# Patient Record
Sex: FEMALE | Race: WHITE | Employment: OTHER | URBAN - METROPOLITAN AREA
[De-identification: names, ages, dates, MRNs, and addresses within clinical notes are randomized per-mention and may not be internally consistent; named-entity substitution may affect disease eponyms.]

---

## 2019-06-13 RX ORDER — FLUTICASONE PROPIONATE 50 MCG
1 SPRAY, SUSPENSION (ML) NASAL DAILY PRN
COMMUNITY

## 2019-06-13 RX ORDER — CITALOPRAM HYDROBROMIDE 20 MG/1
20 TABLET ORAL DAILY
COMMUNITY

## 2019-06-17 ENCOUNTER — ANESTHESIA EVENT (OUTPATIENT)
Dept: SURGERY | Facility: CLINIC | Age: 39
End: 2019-06-17
Payer: COMMERCIAL

## 2019-06-17 ENCOUNTER — HOSPITAL ENCOUNTER (OUTPATIENT)
Facility: CLINIC | Age: 39
Discharge: HOME | End: 2019-06-17
Attending: OTOLARYNGOLOGY | Admitting: OTOLARYNGOLOGY
Payer: COMMERCIAL

## 2019-06-17 ENCOUNTER — ANESTHESIA (OUTPATIENT)
Dept: SURGERY | Facility: CLINIC | Age: 39
End: 2019-06-17
Payer: COMMERCIAL

## 2019-06-17 VITALS
HEIGHT: 62 IN | BODY MASS INDEX: 27.55 KG/M2 | DIASTOLIC BLOOD PRESSURE: 63 MMHG | TEMPERATURE: 98.1 F | WEIGHT: 149.69 LBS | HEART RATE: 77 BPM | OXYGEN SATURATION: 97 % | SYSTOLIC BLOOD PRESSURE: 104 MMHG | RESPIRATION RATE: 12 BRPM

## 2019-06-17 DIAGNOSIS — H70.11 CHRONIC MASTOIDITIS, RIGHT: Primary | ICD-10-CM

## 2019-06-17 LAB
GLUCOSE BLDC GLUCOMTR-MCNC: 97 MG/DL (ref 70–99)
HCG UR QL: NEGATIVE

## 2019-06-17 PROCEDURE — 25000128 H RX IP 250 OP 636: Performed by: ANESTHESIOLOGY

## 2019-06-17 PROCEDURE — 27210794 ZZH OR GENERAL SUPPLY STERILE: Performed by: OTOLARYNGOLOGY

## 2019-06-17 PROCEDURE — 25000566 ZZH SEVOFLURANE, EA 15 MIN: Performed by: OTOLARYNGOLOGY

## 2019-06-17 PROCEDURE — 36000062 ZZH SURGERY LEVEL 4 1ST 30 MIN - UMMC: Performed by: OTOLARYNGOLOGY

## 2019-06-17 PROCEDURE — 25000128 H RX IP 250 OP 636: Performed by: NURSE ANESTHETIST, CERTIFIED REGISTERED

## 2019-06-17 PROCEDURE — 25000125 ZZHC RX 250: Performed by: NURSE ANESTHETIST, CERTIFIED REGISTERED

## 2019-06-17 PROCEDURE — 25000132 ZZH RX MED GY IP 250 OP 250 PS 637: Performed by: OTOLARYNGOLOGY

## 2019-06-17 PROCEDURE — 25800030 ZZH RX IP 258 OP 636: Performed by: NURSE ANESTHETIST, CERTIFIED REGISTERED

## 2019-06-17 PROCEDURE — 37000008 ZZH ANESTHESIA TECHNICAL FEE, 1ST 30 MIN: Performed by: OTOLARYNGOLOGY

## 2019-06-17 PROCEDURE — 71000027 ZZH RECOVERY PHASE 2 EACH 15 MINS: Performed by: OTOLARYNGOLOGY

## 2019-06-17 PROCEDURE — 82962 GLUCOSE BLOOD TEST: CPT

## 2019-06-17 PROCEDURE — 25000125 ZZHC RX 250: Performed by: OTOLARYNGOLOGY

## 2019-06-17 PROCEDURE — 25000132 ZZH RX MED GY IP 250 OP 250 PS 637: Performed by: ANESTHESIOLOGY

## 2019-06-17 PROCEDURE — 37000009 ZZH ANESTHESIA TECHNICAL FEE, EACH ADDTL 15 MIN: Performed by: OTOLARYNGOLOGY

## 2019-06-17 PROCEDURE — 40000170 ZZH STATISTIC PRE-PROCEDURE ASSESSMENT II: Performed by: OTOLARYNGOLOGY

## 2019-06-17 PROCEDURE — 71000014 ZZH RECOVERY PHASE 1 LEVEL 2 FIRST HR: Performed by: OTOLARYNGOLOGY

## 2019-06-17 PROCEDURE — 36000064 ZZH SURGERY LEVEL 4 EA 15 ADDTL MIN - UMMC: Performed by: OTOLARYNGOLOGY

## 2019-06-17 PROCEDURE — 25000125 ZZHC RX 250: Performed by: ANESTHESIOLOGY

## 2019-06-17 PROCEDURE — 81025 URINE PREGNANCY TEST: CPT | Performed by: ANESTHESIOLOGY

## 2019-06-17 RX ORDER — CLINDAMYCIN HCL 300 MG
300 CAPSULE ORAL 3 TIMES DAILY
Qty: 30 CAPSULE | Refills: 0 | Status: SHIPPED | OUTPATIENT
Start: 2019-06-17

## 2019-06-17 RX ORDER — SODIUM CHLORIDE, SODIUM LACTATE, POTASSIUM CHLORIDE, CALCIUM CHLORIDE 600; 310; 30; 20 MG/100ML; MG/100ML; MG/100ML; MG/100ML
INJECTION, SOLUTION INTRAVENOUS CONTINUOUS PRN
Status: DISCONTINUED | OUTPATIENT
Start: 2019-06-17 | End: 2019-06-17

## 2019-06-17 RX ORDER — MUPIROCIN 20 MG/G
OINTMENT TOPICAL 3 TIMES DAILY
Qty: 1 G | Refills: 0 | Status: SHIPPED | OUTPATIENT
Start: 2019-06-17

## 2019-06-17 RX ORDER — CLINDAMYCIN PHOSPHATE 900 MG/50ML
900 INJECTION, SOLUTION INTRAVENOUS SEE ADMIN INSTRUCTIONS
Status: DISCONTINUED | OUTPATIENT
Start: 2019-06-17 | End: 2019-06-17 | Stop reason: HOSPADM

## 2019-06-17 RX ORDER — MAGNESIUM HYDROXIDE 1200 MG/15ML
LIQUID ORAL PRN
Status: DISCONTINUED | OUTPATIENT
Start: 2019-06-17 | End: 2019-06-17 | Stop reason: HOSPADM

## 2019-06-17 RX ORDER — EPHEDRINE SULFATE 50 MG/ML
INJECTION, SOLUTION INTRAMUSCULAR; INTRAVENOUS; SUBCUTANEOUS PRN
Status: DISCONTINUED | OUTPATIENT
Start: 2019-06-17 | End: 2019-06-17

## 2019-06-17 RX ORDER — FENTANYL CITRATE 50 UG/ML
INJECTION, SOLUTION INTRAMUSCULAR; INTRAVENOUS PRN
Status: DISCONTINUED | OUTPATIENT
Start: 2019-06-17 | End: 2019-06-17

## 2019-06-17 RX ORDER — ONDANSETRON 2 MG/ML
INJECTION INTRAMUSCULAR; INTRAVENOUS PRN
Status: DISCONTINUED | OUTPATIENT
Start: 2019-06-17 | End: 2019-06-17

## 2019-06-17 RX ORDER — PROPOFOL 10 MG/ML
INJECTION, EMULSION INTRAVENOUS PRN
Status: DISCONTINUED | OUTPATIENT
Start: 2019-06-17 | End: 2019-06-17

## 2019-06-17 RX ORDER — LIDOCAINE HYDROCHLORIDE AND EPINEPHRINE 10; 10 MG/ML; UG/ML
INJECTION, SOLUTION INFILTRATION; PERINEURAL PRN
Status: DISCONTINUED | OUTPATIENT
Start: 2019-06-17 | End: 2019-06-17 | Stop reason: HOSPADM

## 2019-06-17 RX ORDER — ONDANSETRON 2 MG/ML
4 INJECTION INTRAMUSCULAR; INTRAVENOUS EVERY 30 MIN PRN
Status: DISCONTINUED | OUTPATIENT
Start: 2019-06-17 | End: 2019-06-17 | Stop reason: HOSPADM

## 2019-06-17 RX ORDER — OXYMETAZOLINE HYDROCHLORIDE 0.05 G/100ML
SPRAY NASAL PRN
Status: DISCONTINUED | OUTPATIENT
Start: 2019-06-17 | End: 2019-06-17 | Stop reason: HOSPADM

## 2019-06-17 RX ORDER — SODIUM CHLORIDE, SODIUM LACTATE, POTASSIUM CHLORIDE, CALCIUM CHLORIDE 600; 310; 30; 20 MG/100ML; MG/100ML; MG/100ML; MG/100ML
INJECTION, SOLUTION INTRAVENOUS CONTINUOUS
Status: DISCONTINUED | OUTPATIENT
Start: 2019-06-17 | End: 2019-06-17 | Stop reason: HOSPADM

## 2019-06-17 RX ORDER — DEXAMETHASONE SODIUM PHOSPHATE 4 MG/ML
INJECTION, SOLUTION INTRA-ARTICULAR; INTRALESIONAL; INTRAMUSCULAR; INTRAVENOUS; SOFT TISSUE PRN
Status: DISCONTINUED | OUTPATIENT
Start: 2019-06-17 | End: 2019-06-17

## 2019-06-17 RX ORDER — LIDOCAINE 40 MG/G
CREAM TOPICAL
Status: DISCONTINUED | OUTPATIENT
Start: 2019-06-17 | End: 2019-06-17 | Stop reason: HOSPADM

## 2019-06-17 RX ORDER — GABAPENTIN 100 MG/1
300 CAPSULE ORAL ONCE
Status: COMPLETED | OUTPATIENT
Start: 2019-06-17 | End: 2019-06-17

## 2019-06-17 RX ORDER — CIPROFLOXACIN AND DEXAMETHASONE 3; 1 MG/ML; MG/ML
4 SUSPENSION/ DROPS AURICULAR (OTIC) 2 TIMES DAILY
Qty: 1 BOTTLE | Refills: 0 | Status: SHIPPED | OUTPATIENT
Start: 2019-06-17

## 2019-06-17 RX ORDER — ACETAMINOPHEN 325 MG/1
975 TABLET ORAL ONCE
Status: COMPLETED | OUTPATIENT
Start: 2019-06-17 | End: 2019-06-17

## 2019-06-17 RX ORDER — LIDOCAINE HYDROCHLORIDE 20 MG/ML
INJECTION, SOLUTION INFILTRATION; PERINEURAL PRN
Status: DISCONTINUED | OUTPATIENT
Start: 2019-06-17 | End: 2019-06-17

## 2019-06-17 RX ORDER — ONDANSETRON 4 MG/1
4 TABLET, FILM COATED ORAL EVERY 6 HOURS PRN
Qty: 15 TABLET | Refills: 0 | Status: SHIPPED | OUTPATIENT
Start: 2019-06-17

## 2019-06-17 RX ORDER — SCOLOPAMINE TRANSDERMAL SYSTEM 1 MG/1
1 PATCH, EXTENDED RELEASE TRANSDERMAL ONCE
Status: COMPLETED | OUTPATIENT
Start: 2019-06-17 | End: 2019-06-17

## 2019-06-17 RX ORDER — ONDANSETRON 4 MG/1
4 TABLET, ORALLY DISINTEGRATING ORAL EVERY 30 MIN PRN
Status: DISCONTINUED | OUTPATIENT
Start: 2019-06-17 | End: 2019-06-17 | Stop reason: HOSPADM

## 2019-06-17 RX ORDER — CLINDAMYCIN PHOSPHATE 900 MG/50ML
900 INJECTION, SOLUTION INTRAVENOUS
Status: COMPLETED | OUTPATIENT
Start: 2019-06-17 | End: 2019-06-17

## 2019-06-17 RX ORDER — FENTANYL CITRATE 50 UG/ML
25-50 INJECTION, SOLUTION INTRAMUSCULAR; INTRAVENOUS
Status: DISCONTINUED | OUTPATIENT
Start: 2019-06-17 | End: 2019-06-17 | Stop reason: HOSPADM

## 2019-06-17 RX ORDER — MEPERIDINE HYDROCHLORIDE 25 MG/ML
12.5 INJECTION INTRAMUSCULAR; INTRAVENOUS; SUBCUTANEOUS
Status: DISCONTINUED | OUTPATIENT
Start: 2019-06-17 | End: 2019-06-17 | Stop reason: HOSPADM

## 2019-06-17 RX ORDER — HYDROCODONE BITARTRATE AND ACETAMINOPHEN 5; 325 MG/1; MG/1
1-2 TABLET ORAL EVERY 4 HOURS PRN
Qty: 30 TABLET | Refills: 0 | Status: SHIPPED | OUTPATIENT
Start: 2019-06-17

## 2019-06-17 RX ORDER — NALOXONE HYDROCHLORIDE 0.4 MG/ML
.1-.4 INJECTION, SOLUTION INTRAMUSCULAR; INTRAVENOUS; SUBCUTANEOUS
Status: DISCONTINUED | OUTPATIENT
Start: 2019-06-17 | End: 2019-06-17 | Stop reason: HOSPADM

## 2019-06-17 RX ORDER — HYDROCODONE BITARTRATE AND ACETAMINOPHEN 5; 325 MG/1; MG/1
1 TABLET ORAL
Status: COMPLETED | OUTPATIENT
Start: 2019-06-17 | End: 2019-06-17

## 2019-06-17 RX ORDER — CIPROFLOXACIN AND DEXAMETHASONE 3; 1 MG/ML; MG/ML
SUSPENSION/ DROPS AURICULAR (OTIC) PRN
Status: DISCONTINUED | OUTPATIENT
Start: 2019-06-17 | End: 2019-06-17 | Stop reason: HOSPADM

## 2019-06-17 RX ORDER — HYDROMORPHONE HYDROCHLORIDE 1 MG/ML
.3-.5 INJECTION, SOLUTION INTRAMUSCULAR; INTRAVENOUS; SUBCUTANEOUS EVERY 10 MIN PRN
Status: DISCONTINUED | OUTPATIENT
Start: 2019-06-17 | End: 2019-06-17 | Stop reason: HOSPADM

## 2019-06-17 RX ORDER — MUPIROCIN 20 MG/G
OINTMENT TOPICAL PRN
Status: DISCONTINUED | OUTPATIENT
Start: 2019-06-17 | End: 2019-06-17 | Stop reason: HOSPADM

## 2019-06-17 RX ORDER — PROPOFOL 10 MG/ML
INJECTION, EMULSION INTRAVENOUS CONTINUOUS PRN
Status: DISCONTINUED | OUTPATIENT
Start: 2019-06-17 | End: 2019-06-17

## 2019-06-17 RX ADMIN — ACETAMINOPHEN 975 MG: 325 TABLET, FILM COATED ORAL at 06:44

## 2019-06-17 RX ADMIN — ONDANSETRON 4 MG: 2 INJECTION INTRAMUSCULAR; INTRAVENOUS at 09:57

## 2019-06-17 RX ADMIN — SODIUM CHLORIDE, POTASSIUM CHLORIDE, SODIUM LACTATE AND CALCIUM CHLORIDE: 600; 310; 30; 20 INJECTION, SOLUTION INTRAVENOUS at 07:32

## 2019-06-17 RX ADMIN — Medication 5 MG: at 08:17

## 2019-06-17 RX ADMIN — HYDROCODONE BITARTRATE AND ACETAMINOPHEN 1 TABLET: 5; 325 TABLET ORAL at 11:15

## 2019-06-17 RX ADMIN — FENTANYL CITRATE 50 MCG: 50 INJECTION, SOLUTION INTRAMUSCULAR; INTRAVENOUS at 08:37

## 2019-06-17 RX ADMIN — CLINDAMYCIN PHOSPHATE 900 MG: 18 INJECTION, SOLUTION INTRAVENOUS at 07:53

## 2019-06-17 RX ADMIN — ONDANSETRON 4 MG: 2 INJECTION INTRAMUSCULAR; INTRAVENOUS at 13:45

## 2019-06-17 RX ADMIN — Medication 5 MG: at 08:34

## 2019-06-17 RX ADMIN — LIDOCAINE HYDROCHLORIDE 60 MG: 20 INJECTION, SOLUTION INFILTRATION; PERINEURAL at 07:35

## 2019-06-17 RX ADMIN — PROPOFOL 30 MCG/KG/MIN: 10 INJECTION, EMULSION INTRAVENOUS at 07:45

## 2019-06-17 RX ADMIN — SODIUM CHLORIDE, POTASSIUM CHLORIDE, SODIUM LACTATE AND CALCIUM CHLORIDE: 600; 310; 30; 20 INJECTION, SOLUTION INTRAVENOUS at 08:30

## 2019-06-17 RX ADMIN — Medication 5 MG: at 08:55

## 2019-06-17 RX ADMIN — Medication 100 MG: at 07:38

## 2019-06-17 RX ADMIN — FENTANYL CITRATE 50 MCG: 50 INJECTION, SOLUTION INTRAMUSCULAR; INTRAVENOUS at 07:35

## 2019-06-17 RX ADMIN — HYDROMORPHONE HYDROCHLORIDE 0.5 MG: 1 INJECTION, SOLUTION INTRAMUSCULAR; INTRAVENOUS; SUBCUTANEOUS at 10:51

## 2019-06-17 RX ADMIN — PROPOFOL 200 MG: 10 INJECTION, EMULSION INTRAVENOUS at 07:36

## 2019-06-17 RX ADMIN — MIDAZOLAM 2 MG: 1 INJECTION INTRAMUSCULAR; INTRAVENOUS at 07:29

## 2019-06-17 RX ADMIN — DEXAMETHASONE SODIUM PHOSPHATE 8 MG: 4 INJECTION, SOLUTION INTRAMUSCULAR; INTRAVENOUS at 07:35

## 2019-06-17 RX ADMIN — SCOPALAMINE 1 PATCH: 1 PATCH, EXTENDED RELEASE TRANSDERMAL at 06:45

## 2019-06-17 RX ADMIN — Medication 5 MG: at 08:05

## 2019-06-17 RX ADMIN — GABAPENTIN 300 MG: 300 CAPSULE ORAL at 06:44

## 2019-06-17 ASSESSMENT — MIFFLIN-ST. JEOR: SCORE: 1312.25

## 2019-06-17 NOTE — ANESTHESIA PREPROCEDURE EVALUATION
"Anesthesia Pre-Procedure Evaluation    Patient: Taniya Amaya   MRN:     [id number] Gender:   female   Age:    39 year old :      1980        Preoperative Diagnosis: Chronic Mastoidoiditis Right   Procedure(s):  Right Tympanomastoidectomy, Possible Tympanostomy Tube     Past Medical History:   Diagnosis Date     Anxiety      Chronic mastoiditis      PONV (postoperative nausea and vomiting)       Past Surgical History:   Procedure Laterality Date      SECTION                     PHYSICAL EXAM:   Mental Status/Neuro: A/A/O   Airway: Facies: Feasible  Mallampati: II  Mouth/Opening: Full  TM distance: > 6 cm  Neck ROM: Full   Respiratory: Auscultation: CTAB     Resp. Rate: Normal     Resp. Effort: Normal      CV: Rhythm: Regular  Rate: Age appropriate  Heart: Normal Sounds   Comments:      Dental: Normal                  Lab Results   Component Value Date    HCG Negative 2019       Preop Vitals  BP Readings from Last 3 Encounters:   19 112/68    Pulse Readings from Last 3 Encounters:   19 54      Resp Readings from Last 3 Encounters:   19 16    SpO2 Readings from Last 3 Encounters:   19 98%      Temp Readings from Last 1 Encounters:   19 36.6  C (97.9  F) (Oral)    Ht Readings from Last 1 Encounters:   19 1.575 m (5' 2\")      Wt Readings from Last 1 Encounters:   19 67.9 kg (149 lb 11.1 oz)    Estimated body mass index is 27.38 kg/m  as calculated from the following:    Height as of this encounter: 1.575 m (5' 2\").    Weight as of this encounter: 67.9 kg (149 lb 11.1 oz).     LDA:  Peripheral IV 19 Left Hand (Active)   Site Assessment WDL 2019  6:33 AM   Line Status Saline locked 2019  6:33 AM   Phlebitis Scale 0-->no symptoms 2019  6:33 AM   Infiltration Scale 0 2019  6:33 AM   Infiltration Site Treatment Method  None 2019  6:33 AM   Extravasation? No 2019  6:33 AM   Dressing Intervention New dressing  2019  6:33 AM "   Number of days: 0            Assessment:   ASA SCORE: 2    NPO Status: > 2 hours since completed Clear Liquids; > 6 hours since completed Solid Foods   Documentation: H&P complete; Preop Testing complete; Consents complete   Proceeding: Proceed without further delay  Tobacco Use:  NO Active use of Tobacco/UNKNOWN Tobacco use status     Plan:   Anes. Type:  General   Pre-Induction: Acetaminophen PO; Gabapentin PO   Induction:  IV (Standard)   Airway: Oral ETT   Access/Monitoring: PIV   Maintenance: Balanced   Emergence: Procedure Site   Logistics: Same Day Surgery     Postop Pain/Sedation Strategy:  Standard-Options: Opioids PRN     PONV Management:  Adult Risk Factors: Female, H/o PONV or Motion Sickness, Non-Smoker, Postop Opioids  Prevention: Ondansetron; Dexamethasone; Scop. Patch     CONSENT: Direct conversation   Plan and risks discussed with: Patient; Spouse   Blood Products: Consent Deferred (Minimal Blood Loss)                         Ivon Fabian MD

## 2019-06-17 NOTE — OR NURSING
Admit to phase 2  Report received, pt c/o feeing hot and sweaty transferred to recliner  Sudden onset of nausea,pale diaphoretic    Iv fluid resumed  Aromatic inhaler  effective

## 2019-06-17 NOTE — ANESTHESIA CARE TRANSFER NOTE
Patient: Taniya Amaya    Procedure(s):  Right Tympanomastoidectomy, with Myringotomy and Tube    Diagnosis: Chronic Mastoidoiditis Right  Diagnosis Additional Information: No value filed.    Anesthesia Type:   No value filed.     Note:  Airway :Face Mask  Patient transferred to:PACU  Handoff Report: Identifed the Patient, Identified the Reponsible Provider, Reviewed the pertinent medical history, Discussed the surgical course, Reviewed Intra-OP anesthesia mangement and issues during anesthesia, Set expectations for post-procedure period and Allowed opportunity for questions and acknowledgement of understanding      Vitals: (Last set prior to Anesthesia Care Transfer)    CRNA VITALS  6/17/2019 0947 - 6/17/2019 1047      6/17/2019             SpO2:  100 %                Electronically Signed By: DAYA Webb CRNA  June 17, 2019  11:19 AM

## 2019-06-17 NOTE — DISCHARGE INSTRUCTIONS
INFORMATION FOR PATIENTS WHO HAVE HAD EAR SURGERY  DO NOT ALLOW WATER OR MOISTURE IN OR AROUND THE EAR CANAL    OR INCISION  DO NOT WASH YOUR HAIR UNTIL AFTER YOUR FIRST VISIT FOLLOWING SURGERY unless your doctor gives you approval. Precautions must be taken to avoid any kind of water or moisture in your ear incision.   DO NOT BLOW YOUR NOSE. Avoid sneezing, if unavoidable, sneeze with your  mouth open.  SLEEP WITH YOUR HEAD PROPPED UP on two pillows for the first few nights after surgery or until the sutures are removed. This will help reduce swelling and promote drainage. Good sleep also promotes rapid healing.  Firm pillows give the best comfort for sleeping. Some find non-rocking, overstuffed chairs provide comfort. As long as your head is above your feet, a comfortable chair can be used for sleeping.  ACTIVITY:     DO NOT LIFT ANYTHING heavier than 5-10 pounds for 1 week.     Then you may increase on a weekly basis. Example: 2nd week - 10-20 pounds; 3rd week - 20-30 pounds; 4th week - usual normal activity. STOP physical exercises such as aerobics, push-ups, sit-ups, etc, depending on the nature of the surgery performed.      Usually normal activity can be resumed after 1 month.   DO NOT STRAIN. If constipation is a problem, use a laxative.   AVOID QUICK HEAD AND BODY MOVEMENTS, THEY CAN CAUSE DIZZINESS. Some imbalance after surgery is normal, however spinning dizziness (vertigo) should be reported.   IF THE EAR IS PACKED, DO NOT REMOVE THE PACKING   CHANGE THE DRESSING OVER THE INCISION at least twice a day. Make sure you have CLEAN, DRY HANDS when changing the dressing (you will be given supplies including dressings and tape).     Use a cotton-tipped applicator to clean the incision, first with hydrogen peroxide followed by 70% alcohol solution.     Apply a thin layer of Bacitracin ointment twice daily.     Cover with the appropriate dressing. A nurse or physician will advise you on the  proper dressing.   LOOK FOR SIGNS OF INFECTION if you have an incision, inspect it daily. Report increased pain, redness, swelling, or drainage to your doctor.   SOME DRAINAGE FROM YOUR EAR IS NORMAL AFTER SURGERY.  You will probably hear unusual sounds until total hearing is complete, generally 4-6 weeks after surgery.    AIRPLANE TRAVEL IS USUALLY RESTRICTED FOR 1 MONTH. Discuss  flying plans with your doctor to discuss measures to protect your ears.    CALL YOUR DOCTOR IF:     Temperature rises to 101 degrees or greater     Ear drainage increases rather than decreases     If ear drainage develops an odor  FOLLOW UP APPOINTMENT: Unless otherwise instructed by your doctor, return to the clinic in 1 week for packing and/or suture removal. You should be given an appointment when you leave the hospital after surgery, If not, please call for an appointment at [phone]. Depending on the nature of the ear procedure, post-operative appointments may be scheduled every 2-3 weeks after surgery to insure proper healing. Our staff doctors are assisted by two associate doctors to promote quality care.    GENERAL LONG TERM INFORMATION  o Do not fly if you have an upper respiratory infection.  o Patients who have had mastoidectomy or canalplasty will need appointments every 6-12 months for ear cleanings for the remainder of their lives as their ears are no longer self cleaning as normal ears usually are.   o All patients who have had an ear surgery should come for appointments at least once yearly.       IF YOU HAVE QUESTIONS OR CONCERNS, PLEASE CALL OUR OFFICE  AT [phone] (24 hours/day, 7 days/week)                                          See Additional surgical instructions from Dr. Richter in attached paper work provided by Nurse.     Same-Day Surgery   Adult Discharge Orders & Instructions     For 24 hours after surgery:  1. Get plenty of rest.  A responsible adult must stay with you for at least 24 hours after  you leave the hospital.   2. Pain medication can slow your reflexes. Do not drive or use heavy equipment.  If you have weakness or tingling, don't drive or use heavy equipment until this feeling goes away.  3. Mixing alcohol and pain medication can cause dizziness and slow your breathing. It can even be fatal. Do not drink alcohol while taking pain medication.  4. Avoid strenuous or risky activities.  Ask for help when climbing stairs.   5. You may feel lightheaded.  If so, sit for a few minutes before standing.  Have someone help you get up.   6. If you have nausea (feel sick to your stomach), drink only clear liquids such as apple juice, ginger ale, broth or 7-Up.  Rest may also help.  Be sure to drink enough fluids.  Move to a regular diet as you feel able. Take pain medications with a small amount of solid food, such as toast or crackers, to avoid nausea.   7. A slight fever is normal. Call the doctor if your fever is over 100 F (37.7 C) (taken under the tongue) or lasts longer than 24 hours.  8. You may have a dry mouth, muscle aches, trouble sleeping or a sore throat.  These symptoms should go away after 24 hours.  9. Do not make important or legal decisions.   Pain Management:      1. Take pain medication (if prescribed) for pain as directed by your physician.        2. WARNING: If the pain medication you have been prescribed contains Tylenol (acetaminophen), DO NOT take additional doses of Tylenol (acetaminophen). We gave 975 mg of tylenol(acetaminophen) at 6:44 am on 2019. See over the counter medication instructions for next and max dose.      Call your doctor for any of the followin.  Signs of infection (fever, growing tenderness at the surgery site, severe pain, a large amount of drainage or bleeding, foul-smelling drainage, redness, swelling).    2.  It has been over 8 to 10 hours since surgery and you are still not able to urinate (pee).    3.  Headache for over 24 hours.      To contact a  doctor, call clinic # [phone] or:      [phone] and ask for the Resident On Call for: Otolaryngology (answered 24 hours a day)      Emergency Department:  [facility]: [phone]  [facility]: [phone]                    Scopolamine Patch- (Absorbed through the skin)    Prevents nausea and vomiting caused by motion sickness or anesthesia and surgery in adults.    PLEASE remove scopolamine patch from behind your left ear within 24 hours of placement. Remove by 6/18/2019 in the morning.     How to Dispose of This Medicine:      Fold the used patch in half with the sticky sides together. Throw any used patch away so that children or pets cannot get to it.    Possible Side Effects While Using This Medicine:  Call your doctor right away if you notice any of these side effects:      Allergic reaction: Itching or hives, swelling in your face or hands, swelling or tingling in your mouth or throat, chest tightness, trouble breathing.    Blurred vision,    Confusion or memory loss.    Fast,slow, or uneven heartbeat.    Lightheadedness, dizziness, drowsiness, or fainting.    Seeing, hearing, or feeling things that are not there.    Severe eye pain.    Trouble urinating.    If you notice these less serious side effects, talk with your doctor:      Dry mouth.    Dry, itchy, or red eyes.    Restlessness    Skin rash or redness.    If you notice other side effects that you think are caused by this medicine, tell your doctor immediately.             Tips for taking pain medications  To get the best pain relief possible , remember these points:      Take pain medications as directed, before pain becomes severe      Pain medication can upset your stomach: taking it with food may help      Constipation is a common side effect of pain medication. Drink plenty of  Fluids      Eat foods high in fiber. Take a stool softener  if recommended by your doctor or  Pharmacist.        Do not  drink alcohol, drive or operate machinery while taking pain medications.      Ask about other ways to control pain, such as aromatherapy or relaxation.

## 2019-06-17 NOTE — ANESTHESIA POSTPROCEDURE EVALUATION
Anesthesia POST Procedure Evaluation    Patient: Taniya Amaya   MRN:     [id number] Gender:   female   Age:    39 year old :      1980        Preoperative Diagnosis: Chronic Mastoidoiditis Right   Procedure(s):  Right Tympanomastoidectomy, with Myringotomy and Tube   Postop Comments: No value filed.       Anesthesia Type:  General  No value filed.    Reportable Event: NO     PAIN: Uncomplicated   Sign Out status: Comfortable, Well controlled pain     PONV: No PONV   Sign Out status:  No Nausea or Vomiting     Neuro/Psych: Uneventful perioperative course   Sign Out Status: Preoperative baseline; Age appropriate mentation     Airway/Resp.: Uneventful perioperative course   Sign Out Status: Non labored breathing, age appropriate RR; Resp. Status within EXPECTED Parameters     CV: Uneventful perioperative course   Sign Out status: Appropriate BP and perfusion indices; Appropriate HR/Rhythm     Disposition:   Sign Out in:  PACU  Disposition:  Phase II; Home  Recovery Course: Uneventful  Follow-Up: Not required           Last Anesthesia Record Vitals:  CRNA VITALS  2019 0947 - 2019 1047      2019             SpO2:  100 %          Last PACU Vitals:  Vitals Value Taken Time   /76 2019 11:15 AM   Temp 36.7  C (98.1  F) 2019 11:15 AM   Pulse 90 2019 11:15 AM   Resp 8 2019 11:20 AM   SpO2 96 % 2019 11:20 AM   Temp src     NIBP     Pulse     SpO2     Resp     Temp     Ht Rate     Temp 2     Vitals shown include unvalidated device data.      Electronically Signed By: Ivon Fabian MD, 2019, 11:21 AM

## 2019-06-17 NOTE — OR NURSING
"pts  present   Instructions give  Taniya claims she feels better but \"cant hold my eyes open\" nreadily sleeps  "

## 2019-06-17 NOTE — OP NOTE
ENT Operative Note   Pre-Operative Diagnosis:  Chronic otitis media,  conductive hearing loss, chronic mastoiditis, Eustachian tube dysfunction   Post-Operative Diagnosis:  same        Procedure:   1. Left intact canal tympanomastoidectomy  2. mastoid reconstruction 3. myringotomy and tube placement       Surgeon: Hardeep Richter M.D.     Assistant: None  Anesthesia: general endotracheal anesthesia, local with 0.5% marcaine with Epinephrine    Estimated blood loss: 5cc    Complications: None           Findings:      1. Attic blockage was noted with granulation tissues and scar.  The middle ear also had fibrous scar tissue.  ntrum  2.Intact malleus, incus and stapes, footplate mobile    3.No facial nerve dehiscence appreciated, normal FN monitoring    4.Normal drum  5.the tube was replaced with a separate posterior myringotomy, a 2-2000 Paparella tube was used.        Indication:  The patient presented with a history of chronic otitis media, with persistent conductive hearing loss and pain/fullness.  Risks and benefits were explained to the patient and consent was obtained for the procedure.        Operative Description:     The patient was taken to the operating room and underwent general endotracheal anesthesia. A timeout protocol was performed identifying the patient and the procedure. The patient was prepped and draped in the usual sterile fashion.          Local anesthetic was injected into the ear canal and meatus, and postauricularly.   A mini post-auricular incision was made. Temporalparietal fascia was harvested through the incision.  This was briefly placd on a fascia press.  I dissected down to the temporal fascia.  I incised the periosteum and exposed the mastoid.   The mastoid periosteum was elevated with a Lempert elevator. Self-retraining retractors were placed to expose the mastoid cortex and the ear canal.           I performed a harvest of bone chips from the mastoid cortex using a size 6 cutting  drill-bit. This was set aside on a tongue depressor and kept moist. This was used later to help obliterate the mastoid cavity.        A mastoidectomy was performed using cutting and gurmeet drill bits.  The tegmen and ear canal bone was thinned and followed down to the antrum.  The antrum was opened to visualize the disease.  Findings as above. The bone was drilled and curretted to ensure complete removal of disease.  The wound was irrigated, and removal of disease was confirmed.     The canal was exposed from behind. A canal incision was made inferior to the vascular strip. A vertical incision was made, and the vascular strip was retracted forward.     A tympanomeatal flap was raised to the anulus.  The chorda tympanic was dissected away from the drum.  The scutum was curretted to open up the middle ear and visualized the ossicles, findings as above. The dissection was performed to release scar tissue and visualize the ossicles.       The fascia was dyed and placed over the canal up to the drum.  The drum flaps were reapproximated.  The ear tube was coming out, so was removed.  A myringotomy and tube was placed.  Otopore packing was placed, and then soaked with cipro solution.  The vascular strip and anterior skin flaps were replaced.        The mastoid cavity was an obliterated with the bone pâté and the mastoid cortex was reconstructed.  The periosteum was closed over the mastoid bone cortex.        The postauricular incision was then closed with deep sutures and subcuticular sutures. The middle ear was then inspected with the endaural speculum, confirming the packing in place and the flap position.  Additional otopore was placed followed by mupirocin ointment and a cotton ball.  We then placed mastisol and Steri-Strips. A yosef dressing was placed.         The bed was rotated back towards anesthesia. The patient was awakened and extubated and transferred to the recovery unit in stable condition. All counts  were correct.

## 2020-03-11 ENCOUNTER — HEALTH MAINTENANCE LETTER (OUTPATIENT)
Age: 40
End: 2020-03-11

## 2021-01-03 ENCOUNTER — HEALTH MAINTENANCE LETTER (OUTPATIENT)
Age: 41
End: 2021-01-03

## 2021-04-25 ENCOUNTER — HEALTH MAINTENANCE LETTER (OUTPATIENT)
Age: 41
End: 2021-04-25

## 2021-10-10 ENCOUNTER — HEALTH MAINTENANCE LETTER (OUTPATIENT)
Age: 41
End: 2021-10-10

## 2022-05-21 ENCOUNTER — HEALTH MAINTENANCE LETTER (OUTPATIENT)
Age: 42
End: 2022-05-21

## 2022-09-18 ENCOUNTER — HEALTH MAINTENANCE LETTER (OUTPATIENT)
Age: 42
End: 2022-09-18

## 2023-06-04 ENCOUNTER — HEALTH MAINTENANCE LETTER (OUTPATIENT)
Age: 43
End: 2023-06-04

## 2024-02-25 ENCOUNTER — HEALTH MAINTENANCE LETTER (OUTPATIENT)
Age: 44
End: 2024-02-25

## (undated) DEVICE — TUBING IRR CLIP FOR SHORT ATTACH ANSPACH IRR-CLIP-10

## (undated) DEVICE — DRAPE STERI TOWEL SM 1000

## (undated) DEVICE — NDL ANGIOCATH 14GA 1.25" 4048

## (undated) DEVICE — DECANTER TRANSFER DEVICE 2008S

## (undated) DEVICE — ESU ELEC BLADE 2.75" COATED/INSULATED E1455

## (undated) DEVICE — LINEN TOWEL PACK X5 5464

## (undated) DEVICE — BUR BALL 6MM CARBIDE ANSPACH S-6B-C-G1

## (undated) DEVICE — SYR 03ML LL W/O NDL 309657

## (undated) DEVICE — BLADE KNIFE BEAVER TYMPANOPLASTY 2.5MM W/60D DOWN 377200

## (undated) DEVICE — DRAPE MAYO STAND 23X54 8337

## (undated) DEVICE — STPL SKIN PROXIMATE 35 WIDE PMW35

## (undated) DEVICE — BLADE KNIFE BEAVER MINI BEAVER6400

## (undated) DEVICE — STRAP KNEE/BODY 31143004

## (undated) DEVICE — ESU CORD BIPOLAR GREEN 10-4000

## (undated) DEVICE — SOL NACL 0.9% INJ 1000ML BAG 2B1324X

## (undated) DEVICE — DRSG DERMACARE (OWENS SILK) 3X8" 8886834100

## (undated) DEVICE — SYR 05ML LL W/O NDL

## (undated) DEVICE — BUR BALL 4MM DIAMOND COARSE ANSPACH S-4DC-G1

## (undated) DEVICE — NIM ELEC SUBDERMAL NDL PAIRED 2 CHANNEL 8227410

## (undated) DEVICE — SOL WATER IRRIG 1000ML BOTTLE 2F7114

## (undated) DEVICE — DRAPE POUCH INSTRUMENT 1018

## (undated) DEVICE — DRAPE MICROSCOPE MINI LEICA AR8033652

## (undated) DEVICE — TONGUE DEPRESSOR STERILE 6023

## (undated) DEVICE — BUR BALL 3MM CARBIDE EXT LENGTH ANSPACH S-3B-C-G1

## (undated) DEVICE — Device

## (undated) DEVICE — SYR 01ML TBC SLIP TIP W/O NDL

## (undated) DEVICE — DRSG STERI STRIP 1/4X3" R1541

## (undated) DEVICE — SPONGE SURGIFOAM 100 1974

## (undated) DEVICE — DRSG NASOPORE FIRM 4CM 5400-020-004

## (undated) DEVICE — ADH LIQUID MASTISOL TOPICAL VIAL 2-3ML 0523-48

## (undated) DEVICE — BUR BALL ANSPACH FLUTED STD LGNTH 4MM S-4B-C-G1

## (undated) DEVICE — BONE WAX 2.5GM W31G

## (undated) DEVICE — DRSG STERI STRIP 1/2X4" R1547

## (undated) DEVICE — BLADE KNIFE SURG 11 371111

## (undated) DEVICE — SU MONOCRYL 4-0 PS-2 18" UND Y496G

## (undated) DEVICE — BUR BALL 3MM DIAMOND COARSE EXT ANSPACH S-3DC-G1

## (undated) DEVICE — TUBING ANSPACH IRRIGATION HI-FLOW HOSECLIP IRR-TUBE-HF

## (undated) DEVICE — TUBE EAR PAPARELLA 2000 TYPE 2 70140256

## (undated) DEVICE — DRAIN PENROSE 0.25"X18" LATEX FREE GR201

## (undated) DEVICE — SUCTION MANIFOLD DORNOCH ULTRA CART UL-CL500

## (undated) DEVICE — DRSG GLASSCOCK ADULT S-1000

## (undated) DEVICE — SU VICRYL 3-0 X-1 27" UND J458H

## (undated) DEVICE — POSITIONER ARMBOARD FOAM 1PAIR LF FP-ARMB1

## (undated) DEVICE — GLOVE PROTEXIS W/NEU-THERA 8.0  2D73TE80

## (undated) DEVICE — ESU GROUND PAD UNIVERSAL W/O CORD

## (undated) DEVICE — DRSG TELFA 3X8" 1238

## (undated) RX ORDER — OXYMETAZOLINE HYDROCHLORIDE 0.05 G/100ML
SPRAY NASAL
Status: DISPENSED
Start: 2019-06-17

## (undated) RX ORDER — ONDANSETRON 2 MG/ML
INJECTION INTRAMUSCULAR; INTRAVENOUS
Status: DISPENSED
Start: 2019-06-17

## (undated) RX ORDER — DEXAMETHASONE SODIUM PHOSPHATE 4 MG/ML
INJECTION, SOLUTION INTRA-ARTICULAR; INTRALESIONAL; INTRAMUSCULAR; INTRAVENOUS; SOFT TISSUE
Status: DISPENSED
Start: 2019-06-17

## (undated) RX ORDER — PROPOFOL 10 MG/ML
INJECTION, EMULSION INTRAVENOUS
Status: DISPENSED
Start: 2019-06-17

## (undated) RX ORDER — GLYCOPYRROLATE 0.2 MG/ML
INJECTION INTRAMUSCULAR; INTRAVENOUS
Status: DISPENSED
Start: 2019-06-17

## (undated) RX ORDER — CLINDAMYCIN PHOSPHATE 900 MG/50ML
INJECTION, SOLUTION INTRAVENOUS
Status: DISPENSED
Start: 2019-06-17

## (undated) RX ORDER — LIDOCAINE HYDROCHLORIDE 20 MG/ML
INJECTION, SOLUTION EPIDURAL; INFILTRATION; INTRACAUDAL; PERINEURAL
Status: DISPENSED
Start: 2019-06-17

## (undated) RX ORDER — GABAPENTIN 300 MG/1
CAPSULE ORAL
Status: DISPENSED
Start: 2019-06-17

## (undated) RX ORDER — SCOLOPAMINE TRANSDERMAL SYSTEM 1 MG/1
PATCH, EXTENDED RELEASE TRANSDERMAL
Status: DISPENSED
Start: 2019-06-17

## (undated) RX ORDER — HYDROMORPHONE HYDROCHLORIDE 1 MG/ML
INJECTION, SOLUTION INTRAMUSCULAR; INTRAVENOUS; SUBCUTANEOUS
Status: DISPENSED
Start: 2019-06-17

## (undated) RX ORDER — FENTANYL CITRATE 50 UG/ML
INJECTION, SOLUTION INTRAMUSCULAR; INTRAVENOUS
Status: DISPENSED
Start: 2019-06-17

## (undated) RX ORDER — MUPIROCIN 20 MG/G
OINTMENT TOPICAL
Status: DISPENSED
Start: 2019-06-17

## (undated) RX ORDER — HYDROCODONE BITARTRATE AND ACETAMINOPHEN 5; 325 MG/1; MG/1
TABLET ORAL
Status: DISPENSED
Start: 2019-06-17

## (undated) RX ORDER — ACETAMINOPHEN 325 MG/1
TABLET ORAL
Status: DISPENSED
Start: 2019-06-17